# Patient Record
Sex: MALE | Race: WHITE | NOT HISPANIC OR LATINO | Employment: OTHER | URBAN - METROPOLITAN AREA
[De-identification: names, ages, dates, MRNs, and addresses within clinical notes are randomized per-mention and may not be internally consistent; named-entity substitution may affect disease eponyms.]

---

## 2022-11-30 ENCOUNTER — OFFICE VISIT (OUTPATIENT)
Dept: GASTROENTEROLOGY | Facility: CLINIC | Age: 77
End: 2022-11-30

## 2022-11-30 ENCOUNTER — PREP FOR SURGERY (OUTPATIENT)
Dept: OTHER | Facility: HOSPITAL | Age: 77
End: 2022-11-30

## 2022-11-30 DIAGNOSIS — K92.1 BLOOD IN STOOL: Primary | ICD-10-CM

## 2022-11-30 DIAGNOSIS — R19.7 INTERMITTENT DIARRHEA: ICD-10-CM

## 2022-11-30 PROCEDURE — 99204 OFFICE O/P NEW MOD 45 MIN: CPT

## 2022-11-30 RX ORDER — SOD SULF/POT CHLORIDE/MAG SULF 1.479 G
12 TABLET ORAL TAKE AS DIRECTED
Qty: 24 TABLET | Refills: 0 | Status: SHIPPED | OUTPATIENT
Start: 2022-11-30

## 2022-12-01 VITALS
WEIGHT: 178.6 LBS | SYSTOLIC BLOOD PRESSURE: 125 MMHG | HEART RATE: 78 BPM | OXYGEN SATURATION: 100 % | DIASTOLIC BLOOD PRESSURE: 85 MMHG | HEIGHT: 72 IN | BODY MASS INDEX: 24.19 KG/M2

## 2023-01-05 ENCOUNTER — TELEPHONE (OUTPATIENT)
Dept: GASTROENTEROLOGY | Facility: CLINIC | Age: 78
End: 2023-01-05
Payer: MEDICARE

## 2023-01-05 NOTE — TELEPHONE ENCOUNTER
I spoke with Mr De La Cruz, confirmed his scheduled colonoscopy on 01.19.2023, arrival time of 11:30am. Reminded of liquid diet the day prior. Reminded of bowel prep and instructions. Voiced understanding. marielos

## 2023-01-16 RX ORDER — PHENOL 1.4 %
2400 AEROSOL, SPRAY (ML) MUCOUS MEMBRANE DAILY
COMMUNITY

## 2023-01-16 RX ORDER — AMPICILLIN TRIHYDRATE 250 MG
500 CAPSULE ORAL DAILY
COMMUNITY

## 2023-01-16 RX ORDER — LANOLIN ALCOHOL/MO/W.PET/CERES
420 CREAM (GRAM) TOPICAL DAILY
COMMUNITY

## 2023-01-16 RX ORDER — ASPIRIN 81 MG/1
81 TABLET, CHEWABLE ORAL DAILY
COMMUNITY

## 2023-01-16 RX ORDER — VITAMIN E 268 MG
400 CAPSULE ORAL DAILY
COMMUNITY

## 2023-01-16 NOTE — PRE-PROCEDURE INSTRUCTIONS
Patient educated on instructions of clear liquid diet.  Educated on medications to take morning of procedure.  Patient verbalizes instructions on prep for procedure. NPO after midnight morning of procedure with the exception of medications to take ( 2 hours prior to arrival time) with water only and finishing bowel prep. Patient instructions to look for clear to pale yellow liquid stool prior to procedure.  Patient given PAT direct number for any concerns ([phone]).

## 2023-01-19 ENCOUNTER — ANESTHESIA (OUTPATIENT)
Dept: GASTROENTEROLOGY | Facility: HOSPITAL | Age: 78
End: 2023-01-19
Payer: MEDICARE

## 2023-01-19 ENCOUNTER — ANESTHESIA EVENT (OUTPATIENT)
Dept: GASTROENTEROLOGY | Facility: HOSPITAL | Age: 78
End: 2023-01-19
Payer: MEDICARE

## 2023-01-19 ENCOUNTER — HOSPITAL ENCOUNTER (OUTPATIENT)
Facility: HOSPITAL | Age: 78
Setting detail: HOSPITAL OUTPATIENT SURGERY
Discharge: HOME | End: 2023-01-19
Attending: INTERNAL MEDICINE | Admitting: INTERNAL MEDICINE
Payer: MEDICARE

## 2023-01-19 VITALS
HEART RATE: 84 BPM | SYSTOLIC BLOOD PRESSURE: 170 MMHG | BODY MASS INDEX: 22.51 KG/M2 | WEIGHT: 166.23 LBS | DIASTOLIC BLOOD PRESSURE: 100 MMHG | TEMPERATURE: 97.9 F | HEIGHT: 72 IN | RESPIRATION RATE: 15 BRPM | OXYGEN SATURATION: 98 %

## 2023-01-19 LAB — GLUCOSE BLDC GLUCOMTR-MCNC: 106 MG/DL (ref 70–99)

## 2023-01-19 PROCEDURE — 45378 DIAGNOSTIC COLONOSCOPY: CPT | Performed by: INTERNAL MEDICINE

## 2023-01-19 PROCEDURE — 93005 ELECTROCARDIOGRAM TRACING: CPT | Performed by: ANESTHESIOLOGY

## 2023-01-19 PROCEDURE — 82962 GLUCOSE BLOOD TEST: CPT

## 2023-01-19 PROCEDURE — 93010 ELECTROCARDIOGRAM REPORT: CPT | Performed by: INTERNAL MEDICINE

## 2023-01-19 PROCEDURE — 25010000002 PROPOFOL 10 MG/ML EMULSION: Performed by: NURSE ANESTHETIST, CERTIFIED REGISTERED

## 2023-01-19 RX ORDER — SODIUM CHLORIDE, SODIUM LACTATE, POTASSIUM CHLORIDE, CALCIUM CHLORIDE 600; 310; 30; 20 MG/100ML; MG/100ML; MG/100ML; MG/100ML
30 INJECTION, SOLUTION INTRAVENOUS CONTINUOUS
Status: DISCONTINUED | OUTPATIENT
Start: 2023-01-19 | End: 2023-01-19 | Stop reason: HOSPADM

## 2023-01-19 RX ORDER — PROPOFOL 10 MG/ML
VIAL (ML) INTRAVENOUS AS NEEDED
Status: DISCONTINUED | OUTPATIENT
Start: 2023-01-19 | End: 2023-01-19 | Stop reason: SURG

## 2023-01-19 RX ADMIN — PROPOFOL 100 MCG/KG/MIN: 10 INJECTION, EMULSION INTRAVENOUS at 13:10

## 2023-01-19 RX ADMIN — SODIUM CHLORIDE, POTASSIUM CHLORIDE, SODIUM LACTATE AND CALCIUM CHLORIDE 30 ML/HR: 600; 310; 30; 20 INJECTION, SOLUTION INTRAVENOUS at 12:24

## 2023-01-19 RX ADMIN — PROPOFOL 40 MG: 10 INJECTION, EMULSION INTRAVENOUS at 13:10

## 2023-01-19 NOTE — ANESTHESIA POSTPROCEDURE EVALUATION
Patient: Shirlene De La Cruz    Procedure Summary     Date: 01/19/23 Room / Location: [facility] ENDOSCOPY 2 / [facility] ENDOSCOPY    Anesthesia Start: 1310 Anesthesia Stop: 1339    Procedure: COLONOSCOPY Diagnosis:       Blood in stool      Intermittent diarrhea      (Blood in stool [K92.1])      (Intermittent diarrhea [R19.7])    Surgeons: Baron Mario MD Provider: Jayson Brown MD    Anesthesia Type: general ASA Status: 2          Anesthesia Type: general    Vitals  Vitals Value Taken Time   /100 01/19/23 1436   Temp 36.6 °C (97.9 °F) 01/19/23 1415   Pulse 86 01/19/23 1437   Resp 15 01/19/23 1436   SpO2 96 % 01/19/23 1437   Vitals shown include unvalidated device data.        Post Anesthesia Care and Evaluation    Patient location during evaluation: bedside  Patient participation: complete - patient participated  Level of consciousness: awake  Pain management: adequate    Airway patency: patent  Anesthetic complications: No anesthetic complications  PONV Status: none  Cardiovascular status: acceptable and stable  Respiratory status: acceptable  Hydration status: acceptable    Comments: Afib noted intraop, occ RVR.  BP stable.  Tolerated procedure well.    Post op EKG Afib.Rate , BP stable.  Discussed with pt and family; new onset.   Discussed need for follow up to prevent CVA or RVR.    Appointment made by RN with pts PCP at 8am tomorrow.

## 2023-01-19 NOTE — ANESTHESIA PREPROCEDURE EVALUATION
Anesthesia Evaluation     Patient summary reviewed and Nursing notes reviewed   no history of anesthetic complications:  NPO Solid Status: > 8 hours  NPO Liquid Status: > 2 hours           Airway   Mallampati: I  TM distance: >3 FB  Neck ROM: full  No difficulty expected  Dental    (+) edentulous    Pulmonary - negative pulmonary ROS and normal exam    breath sounds clear to auscultation  Cardiovascular - normal exam  Exercise tolerance: good (4-7 METS)    Rhythm: regular  Rate: normal    (+) hypertension,       Neuro/Psych- negative ROS  GI/Hepatic/Renal/Endo    (+)  GERD,      Musculoskeletal (-) negative ROS    Abdominal    Substance History - negative use     OB/GYN negative ob/gyn ROS         Other - negative ROS       ROS/Med Hx Other: PAT Nursing Notes unavailable.                   Anesthesia Plan    ASA 2     general     (Patient understands anesthesia not responsible for dental damage.)  intravenous induction     Anesthetic plan, risks, benefits, and alternatives have been provided, discussed and informed consent has been obtained with: patient.  Pre-procedure education provided  Use of blood products discussed with patient .   Plan discussed with CRNA.        CODE STATUS:

## 2023-01-19 NOTE — H&P
"Pre Procedure History & Physical    Chief Complaint:   Blood in stool    Subjective     HPI:   As above    Past Medical History:   Past Medical History:   Diagnosis Date   • GERD (gastroesophageal reflux disease)    • Hernia repaired in 2015   • Hypertension        Past Surgical History:  Past Surgical History:   Procedure Laterality Date   • HERNIA REPAIR     • MOUTH SURGERY      teeth  extraction       Family History:  Family History   Problem Relation Age of Onset   • Colon cancer Neg Hx    • Malig Hyperthermia Neg Hx        Social History:   reports that he has never smoked. He has been exposed to tobacco smoke. He has never used smokeless tobacco. He reports that he does not drink alcohol and does not use drugs.    Medications:   Medications Prior to Admission   Medication Sig Dispense Refill Last Dose   • aspirin 81 MG chewable tablet Chew 81 mg Daily.   Past Week   • B Complex-Biotin-FA (TH VITAMIN B 50/B-COMPLEX PO) Take 1 tablet by mouth Daily.   Past Week   • calcium carbonate (OS-BOSTON) 600 MG tablet Take 2,400 mg by mouth Daily.   Past Week   • Cinnamon 500 MG capsule Take 500 mg by mouth Daily. 1 capsule   Past Week   • Magnesium Oxide 400 (240 Mg) MG tablet Take 420 mg by mouth Daily.   Past Week   • Sodium Sulfate-Mag Sulfate-KCl (Sutab) 7917-232-316 MG tablet Take 12 tablets by mouth Take As Directed. Take 12 tablets at 6 pm and 12 tablets 4 hours prior to procedure. 24 tablet 0 1/19/2023   • vitamin E 400 UNIT capsule Take 400 Units by mouth Daily.   Past Week   • Zinc Sulfate (ZINC 15 PO) Take 15 mg by mouth Daily.   Past Week       Allergies:  Patient has no known allergies.        Objective     Blood pressure (!) 173/107, pulse 82, temperature 97.5 °F (36.4 °C), temperature source Temporal, resp. rate 20, height 182.9 cm (72\"), weight 75.4 kg (166 lb 3.6 oz), SpO2 95 %.    Physical Exam   Constitutional: Pt is oriented to person, place, and time and well-developed, well-nourished, and in no " distress.   Mouth/Throat: Oropharynx is clear and moist.   Neck: Normal range of motion.   Cardiovascular: Normal rate, regular rhythm and normal heart sounds.    Pulmonary/Chest: Effort normal and breath sounds normal.   Abdominal: Soft. Nontender  Skin: Skin is warm and dry.   Psychiatric: Mood, memory, affect and judgment normal.     Assessment & Plan     Diagnosis:  Blood in stool      Anticipated Surgical Procedure:  colonoscopy    The risks, benefits, and alternatives of this procedure have been discussed with the patient or the responsible party- the patient understands and agrees to proceed.

## 2023-01-21 LAB — QT INTERVAL: 388 MS

## 2023-04-10 NOTE — PROGRESS NOTES
Chief Complaint  Chronic diarrhea     Shirlene De La Cruz is a 78 y.o. male who presents to [facility] [place] for chronic diarrhea     History of present Illness  Patient presents to the office for diarrhea. He is established with dermatology who recommended gluten free diet due to dermatitis and diarrhea. He has had improvement in dermatitis with gluten free diet but no improvement in diarrhea. Diarrhea has worsened over the last 1 month. He has about 8-10 loose stools in a day. He has noticed blood in his stool intermittently but mainly when wiping about 3-4 times a week. Denies NSAID use, abdominal pain and melena. Denies upper GI symptoms.     Colonoscopy 01/19/2023 by Dr. Mario - Normal mucosa throughout with diverticula in the sigmoid colon and grade 1 internal hemorrhoids.    Past Medical History:   Diagnosis Date   • GERD (gastroesophageal reflux disease)    • Hernia repaired in 2015   • Hypertension        Past Surgical History:   Procedure Laterality Date   • COLONOSCOPY N/A 1/19/2023    Procedure: COLONOSCOPY;  Surgeon: Baron Mario MD;  Location: [facility] ENDOSCOPY;  Service: Gastroenterology;  Laterality: N/A;  DIVERTICULOSIS AND HEMORRHOIDS   • HERNIA REPAIR     • MOUTH SURGERY      teeth  extraction         Current Outpatient Medications:   •  aspirin 81 MG chewable tablet, Chew 1 tablet Daily., Disp: , Rfl:   •  atenolol (TENORMIN) 25 MG tablet, Take 1 tablet by mouth Daily., Disp: , Rfl:   •  B Complex-Biotin-FA (TH VITAMIN B 50/B-COMPLEX PO), Take 1 tablet by mouth Daily., Disp: , Rfl:   •  calcium carbonate (OS-BOSTON) 600 MG tablet, Take 4 tablets by mouth Daily., Disp: , Rfl:   •  Cinnamon 500 MG capsule, Take 1 capsule by mouth Daily. 1 capsule, Disp: , Rfl:   •  Magnesium Oxide 400 (240 Mg) MG tablet, Take 420 mg by mouth Daily., Disp: , Rfl:   •  vitamin E 400 UNIT capsule, Take 1 capsule by mouth Daily., Disp: , Rfl:   •  Zinc Sulfate (ZINC 15 PO), Take  "15 mg by mouth Daily., Disp: , Rfl:      No Known Allergies    Family History   Problem Relation Age of Onset   • Colon cancer Neg Hx    • Malig Hyperthermia Neg Hx         Social History     Social History Narrative   • Not on file       Objective       Vital Signs:   BP (!) 163/104 (BP Location: Left arm, Patient Position: Sitting, Cuff Size: Adult)   Pulse 78   Ht 182.9 cm (72.01\")   Wt 79.9 kg (176 lb 3.2 oz)   SpO2 97%   BMI 23.89 kg/m²       Physical Exam  Constitutional:       Appearance: Normal appearance.   HENT:      Head: Normocephalic.   Cardiovascular:      Rate and Rhythm: Normal rate and regular rhythm.   Pulmonary:      Effort: Pulmonary effort is normal.      Breath sounds: Normal breath sounds.   Abdominal:      General: Bowel sounds are normal.      Palpations: Abdomen is soft.   Skin:     General: Skin is warm and dry.   Neurological:      Mental Status: He is alert and oriented to person, place, and time. Mental status is at baseline.   Psychiatric:         Mood and Affect: Mood normal.         Behavior: Behavior normal.         Thought Content: Thought content normal.         Judgment: Judgment normal.         Result Review :                   Assessment and Plan    Diagnoses and all orders for this visit:    1. Diarrhea, unspecified type (Primary)  -     Clostridioides difficile Toxin - Stool, Per Rectum; Future  -     Enteric Bacterial Panel - Stool, Per Rectum; Future  -     Enteric Parasite Panel - Stool, Per Rectum; Future  -     Fecal Lactoferrin Qual. - Stool, Per Rectum; Future      78 year old patient presents to the office for diarrhea. He is established with dermatology who recommended gluten free diet due to dermatitis and diarrhea. He has had improvement in dermatitis with gluten free diet but no improvement in diarrhea. Stool studies ordered due to worsening diarrhea, if negative will proceed with trial of Colestid. Educated patient and son on how to titrate medication. " Patient will follow up in 4 months. Patient is agreeable to plan and will call the office with any questions or concerns.     Follow Up   Return in about 4 months (around 8/11/2023) for diarrhea.  Patient was given instructions and counseling regarding his condition or for health maintenance advice. Please see specific information pulled into the AVS if appropriate.

## 2023-04-11 ENCOUNTER — OFFICE VISIT (OUTPATIENT)
Dept: GASTROENTEROLOGY | Facility: CLINIC | Age: 78
End: 2023-04-11
Payer: MEDICARE

## 2023-04-11 VITALS
HEART RATE: 78 BPM | OXYGEN SATURATION: 97 % | BODY MASS INDEX: 23.86 KG/M2 | DIASTOLIC BLOOD PRESSURE: 104 MMHG | SYSTOLIC BLOOD PRESSURE: 163 MMHG | HEIGHT: 72 IN | WEIGHT: 176.2 LBS

## 2023-04-11 DIAGNOSIS — R19.7 DIARRHEA, UNSPECIFIED TYPE: Primary | ICD-10-CM

## 2023-04-11 PROCEDURE — 99213 OFFICE O/P EST LOW 20 MIN: CPT

## 2023-04-11 PROCEDURE — 1160F RVW MEDS BY RX/DR IN RCRD: CPT

## 2023-04-11 PROCEDURE — 1159F MED LIST DOCD IN RCRD: CPT

## 2023-04-11 RX ORDER — ATENOLOL 25 MG/1
25 TABLET ORAL DAILY
COMMUNITY

## 2023-04-14 ENCOUNTER — PATIENT ROUNDING (BHMG ONLY) (OUTPATIENT)
Dept: GASTROENTEROLOGY | Facility: CLINIC | Age: 78
End: 2023-04-14
Payer: MEDICARE

## 2023-04-14 NOTE — PROGRESS NOTES
"4/14/2023      Hello, may I speak with Shirlene De La Cruz     My name is Zachary. I am calling from [facility] [place]. I show that you had a recent visit with KP Bonner.    Before we get started may I verify your date of birth? 1945    I am calling to officially welcome you to our practice and ask about your recent visit. Is this a good time to talk? Yes.     Tell me about your visit with us. What things went well? \"The whole visit with Yael went great. Both assistants were very friendly. That's a really nice facility in [place].\"         We're always looking for ways to make our patients' experiences even better. Do you have recommendations on ways we may improve? No.     Overall were you satisfied with your first visit to our practice? Yes.     I appreciate you taking the time to speak with me today. Is there anything else I can do for you? No.     I am glad to hear that you had a very good visit and I appreciate you taking the time to provide feedback on this call. We would greatly appreciate you filling out a survey if you receive one in the mail, email or text. This is a great opportunity to provide any additional feedback that you may think of after this call as well.       Thank you, and have a great day.    "

## 2023-04-19 DIAGNOSIS — R19.7 DIARRHEA, UNSPECIFIED TYPE: ICD-10-CM

## 2023-04-19 RX ORDER — MONTELUKAST SODIUM 4 MG/1
1-2 TABLET, CHEWABLE ORAL 2 TIMES DAILY
Qty: 120 TABLET | Refills: 1 | Status: SHIPPED | OUTPATIENT
Start: 2023-04-19

## 2023-04-21 ENCOUNTER — TELEPHONE (OUTPATIENT)
Dept: GASTROENTEROLOGY | Facility: CLINIC | Age: 78
End: 2023-04-21
Payer: MEDICARE

## 2023-04-21 NOTE — TELEPHONE ENCOUNTER
----- Message from KP Nascimento sent at 4/19/2023  3:10 PM EDT -----  I reviewed the patient's most recent stool study which are negative for C. difficile, bacteria, and parasites.  I have sent Colestid to patient's pharmacy for further management of diarrhea.

## 2023-04-21 NOTE — TELEPHONE ENCOUNTER
I spoke with Karina, on EMILY, he is aware of results and to  rx. He did not have any further questions.

## 2023-04-27 ENCOUNTER — TELEPHONE (OUTPATIENT)
Dept: GASTROENTEROLOGY | Facility: CLINIC | Age: 78
End: 2023-04-27
Payer: MEDICARE

## 2023-04-27 DIAGNOSIS — R19.7 DIARRHEA, UNSPECIFIED TYPE: Primary | ICD-10-CM

## 2023-04-27 RX ORDER — DICYCLOMINE HCL 20 MG
20 TABLET ORAL EVERY 6 HOURS PRN
Qty: 120 TABLET | Refills: 1 | Status: CANCELLED | OUTPATIENT
Start: 2023-04-27

## 2023-04-27 RX ORDER — DICYCLOMINE HCL 20 MG
20 TABLET ORAL EVERY 6 HOURS PRN
Qty: 120 TABLET | Refills: 0 | Status: SHIPPED | OUTPATIENT
Start: 2023-04-27

## 2023-04-27 NOTE — TELEPHONE ENCOUNTER
Patient was seen in office 04/11/2023 for diarrhea. Patient was given a prescription for colestid to help manage patients diarrhea. Patient's son called and stated patients diarrhea has been getting worse and patient was having 10-15 BM'S a day along with abdominal cramping. Per ailyn bedolla patient  to try a trail of bentyl and use imodium as needed.  Patients son aware. Will call us with and further questions or concerns

## 2023-05-09 ENCOUNTER — TELEPHONE (OUTPATIENT)
Dept: GASTROENTEROLOGY | Facility: CLINIC | Age: 78
End: 2023-05-09
Payer: MEDICARE

## 2023-05-09 NOTE — TELEPHONE ENCOUNTER
PCP office called to request an urgent appointment for the patient.  He is experiencing an increased amount of diarrhea. Gluten free diet has helped some but not much.  Would like an urgent appointment.  I offered 05.30.23 but they wanted something sooner. Please contact the patients sonPatrizia Collins at [phone] concerning increased symptoms.

## 2023-05-10 RX ORDER — CHOLESTYRAMINE 4 G/9G
4 POWDER, FOR SUSPENSION ORAL 2 TIMES DAILY
Qty: 378 G | Refills: 3 | Status: SHIPPED | OUTPATIENT
Start: 2023-05-10

## 2023-05-10 NOTE — TELEPHONE ENCOUNTER
S/w patient's son ivis. He stated patient is having 10-15 loose stool daily. Patient has been experiencing some stomach cramping and light colored blood in stool. Per ailyn nelson we will try questran powder. prescription sent to patients pharmacy. Spoke with ivis on how to take medication, and advised him patient needs to go to ED for further evaluation if he seems weak, fatigue or dehydrated. Voiced understanding, will call us with any questions or concerns.

## 2023-05-16 ENCOUNTER — TELEPHONE (OUTPATIENT)
Dept: GASTROENTEROLOGY | Facility: CLINIC | Age: 78
End: 2023-05-16
Payer: MEDICARE

## 2023-05-16 RX ORDER — ALOSETRON HYDROCHLORIDE 0.5 MG/1
0.5 TABLET, FILM COATED ORAL 2 TIMES DAILY
Qty: 60 TABLET | Refills: 1 | Status: SHIPPED | OUTPATIENT
Start: 2023-05-16 | End: 2023-06-15

## 2023-05-16 NOTE — TELEPHONE ENCOUNTER
I have discussed patient's symptoms with Dr. Mario. He has advised patient to discontinue Colestid and start Lotronex. I have sent in Lotronex to the patient's pharmacy. Important to educate patient that if he experiences any constipation with this medication he needs to stop it and call the office.

## 2023-05-17 DIAGNOSIS — R19.7 DIARRHEA, UNSPECIFIED TYPE: ICD-10-CM

## 2023-05-17 DIAGNOSIS — K92.1 BLOOD IN STOOL: Primary | ICD-10-CM

## 2023-05-17 NOTE — TELEPHONE ENCOUNTER
S/w patient son. Aware of prescription, advised to d/c medication if constipation occurs and to call the office. Patient's son voiced understanding. Advised son to take patient to ED for further evaluation incase of dehydration. They would like to being medication first. Will follow up in ED if SX'S increase.

## 2023-06-06 ENCOUNTER — OFFICE VISIT (OUTPATIENT)
Dept: GASTROENTEROLOGY | Facility: CLINIC | Age: 78
End: 2023-06-06
Payer: MEDICARE

## 2023-06-06 ENCOUNTER — PREP FOR SURGERY (OUTPATIENT)
Dept: GASTROENTEROLOGY | Facility: CLINIC | Age: 78
End: 2023-06-06

## 2023-06-06 VITALS
HEART RATE: 72 BPM | BODY MASS INDEX: 22.35 KG/M2 | OXYGEN SATURATION: 99 % | DIASTOLIC BLOOD PRESSURE: 87 MMHG | WEIGHT: 165 LBS | HEIGHT: 72 IN | SYSTOLIC BLOOD PRESSURE: 131 MMHG

## 2023-06-06 DIAGNOSIS — R19.7 INTERMITTENT DIARRHEA: Primary | ICD-10-CM

## 2023-06-06 DIAGNOSIS — K92.1 BLOOD IN STOOL: ICD-10-CM

## 2023-06-06 DIAGNOSIS — R63.4 WEIGHT LOSS, ABNORMAL: ICD-10-CM

## 2023-06-06 PROCEDURE — 99214 OFFICE O/P EST MOD 30 MIN: CPT | Performed by: INTERNAL MEDICINE

## 2023-06-06 PROCEDURE — 1160F RVW MEDS BY RX/DR IN RCRD: CPT | Performed by: INTERNAL MEDICINE

## 2023-06-06 PROCEDURE — 1159F MED LIST DOCD IN RCRD: CPT | Performed by: INTERNAL MEDICINE

## 2023-06-06 RX ORDER — SODIUM PICOSULFATE, MAGNESIUM OXIDE, AND ANHYDROUS CITRIC ACID 10; 3.5; 12 MG/160ML; G/160ML; G/160ML
175 LIQUID ORAL TAKE AS DIRECTED
Qty: 350 ML | Refills: 0 | COMMUNITY
Start: 2023-06-06

## 2023-06-06 NOTE — H&P (VIEW-ONLY)
"Chief Complaint    Shirlene De La Cruz is a 78 y.o. male who presents to [facility] for follow-up of weight loss and nocturnal diarrhea.  Patient had a colonoscopy because of rectal bleeding performed in January of this year but was not reportedly having diarrhea at that time.  His colonoscopy was rather unremarkable showing diverticulosis and hemorrhoids however no biopsies were performed at that time.  Over the past several months he describes frequent diarrhea and even at night is sometimes having a bowel movement every hour.  But then he gives a conflicting story of sometimes taking an over-the-counter laxative.  Has been given trials of cholestyramine which she said were not helpful.  Most recently has he has been taking Lotronex which she states helps however then he will take a laxative for unclear reasons.  Most importantly patient has lost about 30 pounds of weight in the past several months.  He was seen by dermatology who recommended a gluten-free diet to help with his skin condition and this has helped but not has not improved his \"diarrhea\".  He did have negative stool studies at the outside hospital including C. difficile    Result Review :     The following data was reviewed by: Baron Mario MD on 06/06/2023:          Data reviewed : GI studies reviewed      Past Medical History:   Diagnosis Date    GERD (gastroesophageal reflux disease)     Hernia repaired in 2015    Hypertension     Vision blurring        Past Surgical History:   Procedure Laterality Date    COLONOSCOPY N/A 1/19/2023    Procedure: COLONOSCOPY;  Surgeon: Baron Mario MD;  Location: [facility] ENDOSCOPY;  Service: Gastroenterology;  Laterality: N/A;  DIVERTICULOSIS AND HEMORRHOIDS    HERNIA REPAIR      MOUTH SURGERY      teeth  extraction       Social History     Social History Narrative    Not on file       Objective     Vital Signs:   /87 (BP Location: Left arm, Patient Position: " "Sitting, Cuff Size: Adult)   Pulse 72   Ht 182.9 cm (72\")   Wt 74.8 kg (165 lb)   SpO2 99%   BMI 22.38 kg/m²     Body mass index is 22.38 kg/m².    Physical Exam            Assessment and Plan    Diagnoses and all orders for this visit:    1. Intermittent diarrhea (Primary)    2. Blood in stool    3. Weight loss, abnormal    I will schedule the patient for an EGD as well as a repeat colonoscopy with planned biopsies throughout the colon.  He seemingly has developed diarrhea.  Lotronex seems to be helpful but there was some confusion as far as how to take the medication and also to stop the medication in the event of constipation which I have again clarified with both him and his son.  Because of his weight loss I will schedule him to ensure he does not have peptic ulcer disease, microscopic colitis, celiac disease, etc.  He has been on gluten-free diet for at least 3 months.  Recommended the patient discontinue taking all laxatives over-the-counter.              Follow Up   No follow-ups on file.  Patient was given instructions and counseling regarding his condition or for health maintenance advice. Please see specific information pulled into the AVS if appropriate.     "

## 2023-06-06 NOTE — PROGRESS NOTES
"Chief Complaint    Shirlene De La Cruz is a 78 y.o. male who presents to [facility] for follow-up of weight loss and nocturnal diarrhea.  Patient had a colonoscopy because of rectal bleeding performed in January of this year but was not reportedly having diarrhea at that time.  His colonoscopy was rather unremarkable showing diverticulosis and hemorrhoids however no biopsies were performed at that time.  Over the past several months he describes frequent diarrhea and even at night is sometimes having a bowel movement every hour.  But then he gives a conflicting story of sometimes taking an over-the-counter laxative.  Has been given trials of cholestyramine which she said were not helpful.  Most recently has he has been taking Lotronex which she states helps however then he will take a laxative for unclear reasons.  Most importantly patient has lost about 30 pounds of weight in the past several months.  He was seen by dermatology who recommended a gluten-free diet to help with his skin condition and this has helped but not has not improved his \"diarrhea\".  He did have negative stool studies at the outside hospital including C. difficile    Result Review :     The following data was reviewed by: Baron Mario MD on 06/06/2023:          Data reviewed : GI studies reviewed      Past Medical History:   Diagnosis Date    GERD (gastroesophageal reflux disease)     Hernia repaired in 2015    Hypertension     Vision blurring        Past Surgical History:   Procedure Laterality Date    COLONOSCOPY N/A 1/19/2023    Procedure: COLONOSCOPY;  Surgeon: Baron Mario MD;  Location: [facility] ENDOSCOPY;  Service: Gastroenterology;  Laterality: N/A;  DIVERTICULOSIS AND HEMORRHOIDS    HERNIA REPAIR      MOUTH SURGERY      teeth  extraction       Social History     Social History Narrative    Not on file       Objective     Vital Signs:   /87 (BP Location: Left arm, Patient Position: " "Sitting, Cuff Size: Adult)   Pulse 72   Ht 182.9 cm (72\")   Wt 74.8 kg (165 lb)   SpO2 99%   BMI 22.38 kg/m²     Body mass index is 22.38 kg/m².    Physical Exam            Assessment and Plan    Diagnoses and all orders for this visit:    1. Intermittent diarrhea (Primary)    2. Blood in stool    3. Weight loss, abnormal    I will schedule the patient for an EGD as well as a repeat colonoscopy with planned biopsies throughout the colon.  He seemingly has developed diarrhea.  Lotronex seems to be helpful but there was some confusion as far as how to take the medication and also to stop the medication in the event of constipation which I have again clarified with both him and his son.  Because of his weight loss I will schedule him to ensure he does not have peptic ulcer disease, microscopic colitis, celiac disease, etc.  He has been on gluten-free diet for at least 3 months.  Recommended the patient discontinue taking all laxatives over-the-counter.              Follow Up   No follow-ups on file.  Patient was given instructions and counseling regarding his condition or for health maintenance advice. Please see specific information pulled into the AVS if appropriate.     "

## 2023-06-09 ENCOUNTER — ANESTHESIA (OUTPATIENT)
Dept: GASTROENTEROLOGY | Facility: HOSPITAL | Age: 78
End: 2023-06-09
Payer: MEDICARE

## 2023-06-09 ENCOUNTER — ANESTHESIA EVENT (OUTPATIENT)
Dept: GASTROENTEROLOGY | Facility: HOSPITAL | Age: 78
End: 2023-06-09
Payer: MEDICARE

## 2023-06-09 ENCOUNTER — HOSPITAL ENCOUNTER (OUTPATIENT)
Facility: HOSPITAL | Age: 78
Setting detail: HOSPITAL OUTPATIENT SURGERY
Discharge: HOME | End: 2023-06-09
Attending: INTERNAL MEDICINE | Admitting: INTERNAL MEDICINE
Payer: MEDICARE

## 2023-06-09 VITALS
TEMPERATURE: 97.7 F | RESPIRATION RATE: 27 BRPM | BODY MASS INDEX: 21.83 KG/M2 | HEART RATE: 118 BPM | SYSTOLIC BLOOD PRESSURE: 129 MMHG | OXYGEN SATURATION: 96 % | DIASTOLIC BLOOD PRESSURE: 85 MMHG | WEIGHT: 160.94 LBS

## 2023-06-09 DIAGNOSIS — R19.7 INTERMITTENT DIARRHEA: ICD-10-CM

## 2023-06-09 DIAGNOSIS — R63.4 WEIGHT LOSS, ABNORMAL: ICD-10-CM

## 2023-06-09 LAB
027 TOXIN: NORMAL
C DIFF TOX GENS STL QL NAA+PROBE: NEGATIVE

## 2023-06-09 PROCEDURE — 43249 ESOPH EGD DILATION <30 MM: CPT | Performed by: INTERNAL MEDICINE

## 2023-06-09 PROCEDURE — 25010000002 PROPOFOL 10 MG/ML EMULSION: Performed by: NURSE ANESTHETIST, CERTIFIED REGISTERED

## 2023-06-09 PROCEDURE — 87505 NFCT AGENT DETECTION GI: CPT | Performed by: INTERNAL MEDICINE

## 2023-06-09 PROCEDURE — C1726 CATH, BAL DIL, NON-VASCULAR: HCPCS | Performed by: INTERNAL MEDICINE

## 2023-06-09 PROCEDURE — 87493 C DIFF AMPLIFIED PROBE: CPT | Performed by: INTERNAL MEDICINE

## 2023-06-09 PROCEDURE — 43239 EGD BIOPSY SINGLE/MULTIPLE: CPT | Performed by: INTERNAL MEDICINE

## 2023-06-09 PROCEDURE — 45380 COLONOSCOPY AND BIOPSY: CPT | Performed by: INTERNAL MEDICINE

## 2023-06-09 PROCEDURE — 88305 TISSUE EXAM BY PATHOLOGIST: CPT | Performed by: INTERNAL MEDICINE

## 2023-06-09 RX ORDER — SODIUM CHLORIDE, SODIUM LACTATE, POTASSIUM CHLORIDE, CALCIUM CHLORIDE 600; 310; 30; 20 MG/100ML; MG/100ML; MG/100ML; MG/100ML
30 INJECTION, SOLUTION INTRAVENOUS CONTINUOUS
Status: DISCONTINUED | OUTPATIENT
Start: 2023-06-09 | End: 2023-06-09 | Stop reason: HOSPADM

## 2023-06-09 RX ORDER — LIDOCAINE HYDROCHLORIDE 20 MG/ML
INJECTION, SOLUTION EPIDURAL; INFILTRATION; INTRACAUDAL; PERINEURAL AS NEEDED
Status: DISCONTINUED | OUTPATIENT
Start: 2023-06-09 | End: 2023-06-09 | Stop reason: SURG

## 2023-06-09 RX ORDER — METHYLPREDNISOLONE 4 MG/1
TABLET ORAL
Qty: 21 TABLET | Refills: 0 | Status: SHIPPED | OUTPATIENT
Start: 2023-06-09

## 2023-06-09 RX ORDER — PROPOFOL 10 MG/ML
VIAL (ML) INTRAVENOUS AS NEEDED
Status: DISCONTINUED | OUTPATIENT
Start: 2023-06-09 | End: 2023-06-09 | Stop reason: SURG

## 2023-06-09 RX ORDER — ESMOLOL HYDROCHLORIDE 10 MG/ML
INJECTION INTRAVENOUS AS NEEDED
Status: DISCONTINUED | OUTPATIENT
Start: 2023-06-09 | End: 2023-06-09 | Stop reason: SURG

## 2023-06-09 RX ORDER — SULFASALAZINE 500 MG/1
1000 TABLET ORAL 3 TIMES DAILY
Qty: 180 TABLET | Refills: 5 | Status: SHIPPED | OUTPATIENT
Start: 2023-06-09

## 2023-06-09 RX ORDER — PHENYLEPHRINE HCL IN 0.9% NACL 1 MG/10 ML
SYRINGE (ML) INTRAVENOUS AS NEEDED
Status: DISCONTINUED | OUTPATIENT
Start: 2023-06-09 | End: 2023-06-09 | Stop reason: SURG

## 2023-06-09 RX ADMIN — Medication 200 MCG: at 13:39

## 2023-06-09 RX ADMIN — PROPOFOL 150 MCG/KG/MIN: 10 INJECTION, EMULSION INTRAVENOUS at 13:29

## 2023-06-09 RX ADMIN — ESMOLOL HYDROCHLORIDE 10 MG: 10 INJECTION INTRAVENOUS at 13:31

## 2023-06-09 RX ADMIN — LIDOCAINE HYDROCHLORIDE 100 MG: 20 INJECTION, SOLUTION EPIDURAL; INFILTRATION; INTRACAUDAL; PERINEURAL at 13:29

## 2023-06-09 RX ADMIN — SODIUM CHLORIDE, POTASSIUM CHLORIDE, SODIUM LACTATE AND CALCIUM CHLORIDE: 600; 310; 30; 20 INJECTION, SOLUTION INTRAVENOUS at 13:26

## 2023-06-09 RX ADMIN — PROPOFOL 50 MG: 10 INJECTION, EMULSION INTRAVENOUS at 13:29

## 2023-06-09 NOTE — ANESTHESIA POSTPROCEDURE EVALUATION
Patient: Shirlene De La Cruz    Procedure Summary     Date: 06/09/23 Room / Location: [facility] ENDOSCOPY 2 / [facility] ENDOSCOPY    Anesthesia Start: 1326 Anesthesia Stop: 1402    Procedures:       ESOPHAGOGASTRODUODENOSCOPY WITH BIOPSIES/ESOPHAGEAL BALLOON DILATATION      COLONOSCOPY WITH BIOPSIES/STOOL SAMPLES Diagnosis:       Intermittent diarrhea      Weight loss, abnormal      (Intermittent diarrhea [R19.7])      (Weight loss, abnormal [R63.4])    Surgeons: Baron aMrio MD Provider: Lee Olson MD    Anesthesia Type: general ASA Status: 3          Anesthesia Type: general    Vitals  No vitals data found for the desired time range.          Post Anesthesia Care and Evaluation    Patient location during evaluation: bedside  Patient participation: complete - patient participated  Level of consciousness: awake  Pain management: adequate    Airway patency: patent  PONV Status: none  Cardiovascular status: acceptable and stable  Respiratory status: acceptable  Hydration status: acceptable    Comments: An Anesthesiologist personally participated in the most demanding procedures (including induction and emergence if applicable) in the anesthesia plan, monitored the course of anesthesia administration at frequent intervals and remained physically present and available for immediate diagnosis and treatment of emergencies.

## 2023-06-09 NOTE — ANESTHESIA PREPROCEDURE EVALUATION
Anesthesia Evaluation     Patient summary reviewed and Nursing notes reviewed   no history of anesthetic complications:   NPO Solid Status: > 8 hours  NPO Liquid Status: > 2 hours           Airway   Mallampati: II  TM distance: >3 FB  Neck ROM: full  No difficulty expected  Dental    (+) upper dentures and poor dentition        Pulmonary - negative pulmonary ROS and normal exam    breath sounds clear to auscultation  Cardiovascular - negative cardio ROS and normal exam  Exercise tolerance: good (4-7 METS)    Rhythm: regular  Rate: normal        Neuro/Psych- negative ROS  GI/Hepatic/Renal/Endo    (+) GERD well controlled    Musculoskeletal (-) negative ROS    Abdominal    Substance History - negative use     OB/GYN negative ob/gyn ROS         Other - negative ROS       ROS/Med Hx Other: PAT Nursing Notes unavailable.                 Anesthesia Plan    ASA 3     general       Anesthetic plan, risks, benefits, and alternatives have been provided, discussed and informed consent has been obtained with: patient and spouse/significant other.    CODE STATUS:

## 2023-06-10 LAB
CRYPTOSP DNA STL QL NAA+NON-PROBE: NOT DETECTED
E HISTOLYT DNA STL QL NAA+NON-PROBE: NOT DETECTED
G LAMBLIA DNA STL QL NAA+NON-PROBE: NOT DETECTED

## 2023-06-13 LAB
CYTO UR: NORMAL
LAB AP CASE REPORT: NORMAL
LAB AP CLINICAL INFORMATION: NORMAL
PATH REPORT.FINAL DX SPEC: NORMAL
PATH REPORT.GROSS SPEC: NORMAL

## 2023-06-16 ENCOUNTER — TELEPHONE (OUTPATIENT)
Dept: GASTROENTEROLOGY | Facility: CLINIC | Age: 78
End: 2023-06-16
Payer: MEDICARE

## 2023-06-16 RX ORDER — ONDANSETRON 4 MG/1
4 TABLET, FILM COATED ORAL EVERY 8 HOURS PRN
Qty: 90 TABLET | Refills: 0 | Status: SHIPPED | OUTPATIENT
Start: 2023-06-16

## 2023-06-16 NOTE — TELEPHONE ENCOUNTER
Karina called stating Shirlene is needing a Rx for Zofran for his nausea.    Per KP Bonner, ok to send in Zofran.    Mojgan Figueroa APRN [facility] [place]  Negative C. difficile and parasite panel.      Spoke with Karina.  Notified of results. And zofran.    Son asked also if ok for dad to start back on Cinnamon?  Per KP Bonner ok.    Karina voiced understanding.  marielos

## 2023-08-08 NOTE — PROGRESS NOTES
Chief Complaint  2 month follow up     Shirlene De La Cruz is a 78 y.o. male who presents to [facility] [place] for 2 month follow up     History of present Illness  Patient presents to the office for 2 month follow up for ulcerative pancolitis.  Patient diagnosed with ulcerative pancolitis after most recent colonoscopy 6/9/2023 -Medrol Dosepak prescribed and patient started on sulfasalazine.  Patient developed urinary retention therefore discontinued sulfasalazine. Not on any medication for management. He reports having 2-3 bowel movements daily, no longer having blood in his stool. Denies abdominal pain. He has gained 13 pounds since last office visit 2 months ago. Most recent CBC 8/01/2023 shows normal hemoglobin.  Overall patient has no GI complaints.    C. difficile 06/09/2023 -negative    EGD/Colonoscopy 06/09/2023 by Dr. 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  Duodenum biopsies-chronic duodenitis.  Moderate erythematous and ulceration throughout colon, diverticula in the sigmoid colon.  Biopsies-chronic colitis with moderate activity throughout colon.  Patient started on sulfasalazine 2 tablets 3 times daily and Medrol Dosepak prescribed. Repeat colonoscopy in 1 year     Past Medical History:   Diagnosis Date    GERD (gastroesophageal reflux disease)     Hernia repaired in 2015    Hypertension     Vision blurring        Past Surgical History:   Procedure Laterality Date    COLONOSCOPY N/A 01/19/2023    Procedure: COLONOSCOPY;  Surgeon: Baron Mario MD;  Location: [facility] ENDOSCOPY;  Service: Gastroenterology;  Laterality: N/A;  DIVERTICULOSIS AND HEMORRHOIDS    COLONOSCOPY N/A 06/09/2023    Procedure: COLONOSCOPY WITH BIOPSIES/STOOL SAMPLES;  Surgeon: Baron Mario MD;  Location: [facility] ENDOSCOPY;  Service: Gastroenterology;  Laterality: N/A;  DIVERTICULOSIS  COLITIS    ENDOSCOPY  "N/A 06/09/2023    Procedure: ESOPHAGOGASTRODUODENOSCOPY WITH BIOPSIES/ESOPHAGEAL BALLOON DILATATION;  Surgeon: Baron Mario MD;  Location: [facility] ENDOSCOPY;  Service: Gastroenterology;  Laterality: N/A;   ESOPHAGEAL STRICTURE  HIATAL HERNIA    HERNIA REPAIR      MOUTH SURGERY      teeth  extraction    UPPER GASTROINTESTINAL ENDOSCOPY           Current Outpatient Medications:     aspirin 81 MG chewable tablet, Chew 1 tablet Daily., Disp: , Rfl:     atenolol (TENORMIN) 25 MG tablet, Take 1 tablet by mouth Daily., Disp: , Rfl:     B Complex-Biotin-FA (TH VITAMIN B 50/B-COMPLEX PO), Take 1 tablet by mouth Daily., Disp: , Rfl:     calcium carbonate (OS-BOSTON) 600 MG tablet, Take 4 tablets by mouth Daily., Disp: , Rfl:     Cinnamon 500 MG capsule, Take 1 capsule by mouth Daily. 1 capsule, Disp: , Rfl:     Magnesium Oxide 400 (240 Mg) MG tablet, Take 420 mg by mouth Daily., Disp: , Rfl:     Zinc Sulfate (ZINC 15 PO), Take 15 mg by mouth Daily., Disp: , Rfl:     methylPREDNISolone (MEDROL) 4 MG dose pack, Take as directed on package instructions. (Patient not taking: Reported on 8/9/2023), Disp: 21 tablet, Rfl: 0    ondansetron (Zofran) 4 MG tablet, Take 1 tablet by mouth Every 8 (Eight) Hours As Needed for Nausea or Vomiting. (Patient not taking: Reported on 8/9/2023), Disp: 90 tablet, Rfl: 0    sulfaSALAzine (AZULFIDINE) 500 MG tablet, Take 2 tablets by mouth 3 (Three) Times a Day. (Patient not taking: Reported on 8/9/2023), Disp: 180 tablet, Rfl: 5     No Known Allergies    Family History   Problem Relation Age of Onset    Colon cancer Neg Hx     Malig Hyperthermia Neg Hx         Social History     Social History Narrative    Not on file       Objective       Vital Signs:   /99 (BP Location: Right arm, Patient Position: Sitting, Cuff Size: Adult)   Pulse 84   Ht 182.9 cm (72.01\")   Wt 78.5 kg (173 lb)   SpO2 99%   BMI 23.46 kg/mý       Physical Exam  Constitutional:       Appearance: Normal appearance. "   HENT:      Head: Normocephalic.   Cardiovascular:      Rate and Rhythm: Normal rate and regular rhythm.      Heart sounds: Normal heart sounds.   Pulmonary:      Effort: Pulmonary effort is normal.      Breath sounds: Normal breath sounds.   Abdominal:      General: Bowel sounds are normal.      Palpations: Abdomen is soft.   Skin:     General: Skin is warm and dry.   Neurological:      Mental Status: He is alert and oriented to person, place, and time. Mental status is at baseline.   Psychiatric:         Mood and Affect: Mood normal.         Behavior: Behavior normal.         Thought Content: Thought content normal.         Judgment: Judgment normal.       Result Review :                     Assessment and Plan    Diagnoses and all orders for this visit:    1. Ulcerative pancolitis without complication (Primary)    78-year-old patient presents to the office for 2 month follow up for ulcerative pancolitis.  Patient diagnosed with ulcerative pancolitis after most recent colonoscopy 6/9/2023 -Medrol Dosepak prescribed and patient started on sulfasalazine.  Patient developed urinary retention therefore discontinued sulfasalazine. Not on any medication for management. He reports having 2-3 bowel movements daily, no longer having blood in his stool. Denies abdominal pain. He has gained 13 pounds since last office visit 2 months ago.  Overall patient is doing very well and has no GI complaints.  Patient and son wish to defer starting medication at this time.  Educated patient and son to call office if diarrhea returns we will repeat Medrol dose pack and consider mesalamine for maintenance.  Patient will follow-up in 4 months.  Patient is agreeable to plan will call the office any questions or concerns.    Follow Up   Return in about 4 months (around 12/9/2023) for UC.  Patient was given instructions and counseling regarding his condition or for health maintenance advice. Please see specific information pulled into the  AVS if appropriate.

## 2023-08-09 ENCOUNTER — OFFICE VISIT (OUTPATIENT)
Dept: GASTROENTEROLOGY | Facility: CLINIC | Age: 78
End: 2023-08-09
Payer: MEDICARE

## 2023-08-09 VITALS
HEIGHT: 72 IN | BODY MASS INDEX: 23.43 KG/M2 | SYSTOLIC BLOOD PRESSURE: 142 MMHG | HEART RATE: 84 BPM | OXYGEN SATURATION: 99 % | WEIGHT: 173 LBS | DIASTOLIC BLOOD PRESSURE: 99 MMHG

## 2023-08-09 DIAGNOSIS — K51.00 ULCERATIVE PANCOLITIS WITHOUT COMPLICATION: Primary | ICD-10-CM

## 2024-02-07 ENCOUNTER — TELEPHONE (OUTPATIENT)
Dept: GASTROENTEROLOGY | Facility: CLINIC | Age: 79
End: 2024-02-07

## 2024-02-07 NOTE — TELEPHONE ENCOUNTER
Contacted Shirlene De La Cruz 1945 regarding the appointment no show with KP Bonner on 2/7/24 @ 1:15 pm . Patient is aware that there is a 24-hour cancellation policy and understands that a no-show letter will be mailed to them at the address on file.The patient states he thought the appointment was canceled by his son. He states he will speak to son to rescheule

## 2024-05-29 ENCOUNTER — TELEPHONE (OUTPATIENT)
Dept: GASTROENTEROLOGY | Facility: CLINIC | Age: 79
End: 2024-05-29
Payer: MEDICARE

## 2024-05-29 DIAGNOSIS — G89.29 CHRONIC ABDOMINAL PAIN: Primary | ICD-10-CM

## 2024-05-29 DIAGNOSIS — R10.9 CHRONIC ABDOMINAL PAIN: Primary | ICD-10-CM

## 2024-05-29 RX ORDER — ONDANSETRON 4 MG/1
4 TABLET, FILM COATED ORAL EVERY 8 HOURS PRN
Qty: 30 TABLET | Refills: 5 | Status: SHIPPED | OUTPATIENT
Start: 2024-05-29

## 2024-05-29 NOTE — TELEPHONE ENCOUNTER
Patients son Karina called in stating that the medication Sulfasalazine 500mg is helping his dad with the Celiac disease. However the patient is having some nausea with the medication, and he was previously prescribed Zofran to help. They were wondering if he could get a refill of the Zofran to help.    Please advise

## 2024-05-29 NOTE — TELEPHONE ENCOUNTER
Spoke with the patients son Karina DIAZ, informed him that the Zofran has been sent to the pharmacy. He expressed clear understanding.

## 2025-07-21 RX ORDER — SULFASALAZINE 500 MG/1
TABLET ORAL
Qty: 180 TABLET | Refills: 5 | OUTPATIENT
Start: 2025-07-21

## 2025-07-21 NOTE — TELEPHONE ENCOUNTER
Medication Requested Sulfasalazine    Last Refill 6/9/23    Last OV 8/9/23    Next OV no appt scheduled    Medication pended for approval and correct pharmacy verified NO    Per last office note patient discontinued sulfasalazine

## (undated) DEVICE — SINGLE-USE BIOPSY FORCEPS: Brand: RADIAL JAW 4

## (undated) DEVICE — LINER SURG CANSTR SXN S/RIGD 1500CC

## (undated) DEVICE — SOL IRRG H2O PL/BG 1000ML STRL

## (undated) DEVICE — Device

## (undated) DEVICE — BLCK/BITE BLOX WO/DENTL/RIM W/STRAP 54F

## (undated) DEVICE — CONN JET HYDRA H20 AUXILIARY DISP

## (undated) DEVICE — SOLIDIFIER LIQLOC PLS 1500CC BT

## (undated) DEVICE — Device: Brand: DEFENDO AIR/WATER/SUCTION AND BIOPSY VALVE

## (undated) DEVICE — ESOPHAGEAL BALLOON DILATATION CATHETER: Brand: CRE FIXED WIRE

## (undated) DEVICE — DEV INFL CRE STERIFLATE 60CC DISP